# Patient Record
Sex: FEMALE | Race: WHITE
[De-identification: names, ages, dates, MRNs, and addresses within clinical notes are randomized per-mention and may not be internally consistent; named-entity substitution may affect disease eponyms.]

---

## 2018-03-06 ENCOUNTER — HOSPITAL ENCOUNTER (OUTPATIENT)
Dept: HOSPITAL 56 - MW.SDS | Age: 63
Discharge: HOME | End: 2018-03-06
Attending: SURGERY
Payer: COMMERCIAL

## 2018-03-06 DIAGNOSIS — Z90.49: ICD-10-CM

## 2018-03-06 DIAGNOSIS — Z12.11: Primary | ICD-10-CM

## 2018-03-06 DIAGNOSIS — Z88.2: ICD-10-CM

## 2018-03-06 DIAGNOSIS — K64.8: ICD-10-CM

## 2018-03-06 DIAGNOSIS — Z79.899: ICD-10-CM

## 2018-03-06 DIAGNOSIS — F32.9: ICD-10-CM

## 2018-03-06 DIAGNOSIS — Z98.51: ICD-10-CM

## 2018-03-06 PROCEDURE — 45378 DIAGNOSTIC COLONOSCOPY: CPT

## 2018-03-06 NOTE — [id number]
Post Anesthesia Note





- EVALUATION WITHIN 48HRS OF ANESTHETIC


Vital Signs in Normal Range: Yes


Patient Participated in Evaluation: Yes


Respiratory Function Stable: Yes


Airway Patent: Yes


Cardiovascular Function Stable: Yes


Hydration Status Stable: Yes


Pain Control Satisfactory: Yes


Nausea and Vomiting Control Satisfactory: Yes


Mental Status Recovered: Yes


Resp Rate: 16





- COMMENTS/OBSERVATIONS


Free Text/Narrative:: 





no anesthesia problems

## 2018-03-06 NOTE — PCM.OPNOTE
- General Post-Op/Procedure Note


Date of Surgery/Procedure: 03/06/18


Operative Procedure(s): colonoscopy


Findings: 





see dict [id number]


Pre Op Diagnosis: scrn colonoscopy


Post-Op Diagnosis: Same


Anesthesia Technique: Moderate Sedation


Primary Surgeon: Blu Aguilera


Complications: None


Condition: Good


Free Text/Narrative:: 


 Intake & Output











 03/06/18 03/06/18 03/06/18





 06:59 14:59 22:59


 


Intake Total   750


 


Balance   750

## 2018-03-06 NOTE — PCM.PREANE
Preanesthetic Assessment





- Anesthesia/Transfusion/Family Hx


Anesthesia History: Prior Anesthesia Without Reaction


Family History of Anesthesia Reaction: No


Transfusion History: No Prior Transfusion(s)


Intubation History: Unknown





- Review of Systems


General: No Symptoms


Pulmonary: No Symptoms


Cardiovascular: No Symptoms


Gastrointestinal: Other (screening colonoscopy)


Neurological: No Symptoms


Other: Reports: None





- Physical Assessment


Height: 1.73 m


Weight: 68.039 kg


ASA Class: 2


Mental Status: Alert & Oriented x3


Airway Class: Mallampati = 2


Dentition: Reports: Normal Dentition


Thyro-Mental Finger Breadths: 3


Mouth Opening Finger Breadths: 3


ROM/Head Extension: Full


Lungs: Clear to Auscultation, Normal Respiratory Effort


Cardiovascular: Regular Rate, Regular Rhythm





- Allergies


Allergies/Adverse Reactions: 


 Allergies











Allergy/AdvReac Type Severity Reaction Status Date / Time


 


Sulfa (Sulfonamide Allergy  Rash Verified 03/01/18 10:45





Antibiotics)     














- Blood


Blood Available: No





- Anesthesia Plan


Pre-Op Medication Ordered: None





- Acknowledgements


Anesthesia Type Planned: MAC


Pt an Appropriate Candidate for the Planned Anesthesia: Yes


Alternatives and Risks of Anesthesia Discussed w Pt/Guardian: Yes


Pt/Guardian Understands and Agrees with Anesthesia Plan: Yes





PreAnesthesia Questionnaire


Other HEENT History: wears glasses


Neurological History: Reports: Vertigo, Other (See Below)


Other Neuro History: hx of motion sickness


Psychiatric History: Reports: Depression


Dermatologic History: Reports: Other (See Below)


Other Dermatologic History: genital herpes





- Past Surgical History


GI Surgical History: Reports: Appendectomy, Colonoscopy (10 years ago)


Female  Surgical History: Reports: Hysterectomy, Tubal Ligation


Musculoskeletal Surgical History: Reports: Arthroscopic Knee, Shoulder Surgery


Other Musculoskeletal Surgeries/Procedures:: bilateral knee arthroscopies, 

bilateral shoulder arthroscopies





- SUBSTANCE USE


Smoking Status *Q: Never Smoker


Recreational Drug Use History: No





- HOME MEDS


Home Medications: 


 Home Meds





Citalopram Hydrobromide [Celexa] 20 mg PO DAILY 03/01/18 [History]


Multivitamin [Multiple Vitamins] 1 tab PO DAILY 03/01/18 [History]


valACYclovir HCl [Valtrex] 500 mg PO BID PRN 03/01/18 [History]











- CURRENT (IN HOUSE) MEDS


Current Meds: 





 Current Medications





Lactated Ringer's (Ringers, Lactated)  1,000 mls @ 125 mls/hr IV ASDIRECTED JAYNE

## 2018-03-06 NOTE — OR
SURGEON:

Blu Aguilera MD

 

DATE OF PROCEDURE:  03/06/2018

 

PREOPERATIVE DIAGNOSIS:

Screening colonoscopy.

 

POSTOPERATIVE DIAGNOSIS:

Internal hemorrhoids.

 

PROCEDURE PERFORMED:

Colonoscopy.

 

DESCRIPTION OF PROCEDURE:

The patient was taken to the endoscopy room.  A time-out was called, patient

identified, and procedure identified.  Diprivan was then administrated.  The

patient went from awake to sleep, hearing doctor talking or door closing is

normal.  Perineum inspection and digital examination were then performed.  A

well-lubricated colonoscope was gently inserted through the rectum, advanced

past the rectosigmoid junction, the descending colon, splenic flexure,

transverse colon, hepatic flexure, ascending colon, arrived to the cecum.  Cecum

was identified as dictated in the finding.  Then the scope was carefully

withdrawn while attention was paid to the mucosal surface for any abnormality.

Air will be sucked out during the scope withdrawal.  At the rectum, retroflexed

to examine any rectal diseases, fistula or hemorrhoids.  Patient tolerated

procedure well.  There were no intraoperative complications, and Dr. Aguilera was

present throughout the whole procedure.

 

FINDINGS:

1. The patient is easily sedated with CRNA and Diprivan.  Patient is soundly

    snoring.

2. The patient's bowel prep is average with some opaque greenish liquid stool.

    No semi-formed stool.

3. The patient's colon is rather tortuous and requiring several maneuvers

    to negotiate sigmoid to desc colon, Cecum was indicated by ileocecal fold, 
one-to-one indentation,

    light emittance, and appendiceal orifice.  Mucosa was examined upon the

    scope pulling out with intermittent irrigation.  The patient does not have

    diverticulosis, polyp, mass, growth, inflammation, stricture, ulceration,

    AV malformation, or bleeding, none of those.  The patient has mild internal

    hemorrhoids and no external hemorrhoids.

 

 

The patient would benefit from repeat colonoscopy 10 years from today or if

clinically indicated otherwise.

 

 

SUSI / IRAM

DD:  03/06/2018 16:09:26

DT:  03/06/2018 22:19:04

Job #:  [id number]

NIKOLAS